# Patient Record
Sex: FEMALE | Race: WHITE | NOT HISPANIC OR LATINO | URBAN - METROPOLITAN AREA
[De-identification: names, ages, dates, MRNs, and addresses within clinical notes are randomized per-mention and may not be internally consistent; named-entity substitution may affect disease eponyms.]

---

## 2017-01-01 ENCOUNTER — HOSPITAL ENCOUNTER (OUTPATIENT)
Dept: PALLIATIVE MEDICINE | Facility: OTHER | Age: 32
Discharge: HOME | End: 2017-08-03
Attending: ANESTHESIOLOGY

## 2017-01-01 ENCOUNTER — COMMUNICATION - HEALTHEAST (OUTPATIENT)
Dept: PALLIATIVE MEDICINE | Facility: OTHER | Age: 32
End: 2017-01-01

## 2017-01-01 ENCOUNTER — AMBULATORY - HEALTHEAST (OUTPATIENT)
Dept: PALLIATIVE MEDICINE | Facility: OTHER | Age: 32
End: 2017-01-01

## 2017-01-01 ENCOUNTER — COMMUNICATION - HEALTHEAST (OUTPATIENT)
Dept: PALLIATIVE MEDICINE | Facility: CLINIC | Age: 32
End: 2017-01-01

## 2017-01-01 ENCOUNTER — RECORDS - HEALTHEAST (OUTPATIENT)
Dept: ADMINISTRATIVE | Facility: OTHER | Age: 32
End: 2017-01-01

## 2017-01-01 ENCOUNTER — ANESTHESIA - HEALTHEAST (OUTPATIENT)
Dept: SURGERY | Facility: CLINIC | Age: 32
End: 2017-01-01

## 2017-01-01 ENCOUNTER — HOSPITAL ENCOUNTER (OUTPATIENT)
Dept: PALLIATIVE MEDICINE | Facility: OTHER | Age: 32
Discharge: HOME | End: 2017-06-16
Attending: ANESTHESIOLOGY

## 2017-01-01 ENCOUNTER — AMBULATORY - HEALTHEAST (OUTPATIENT)
Dept: PALLIATIVE MEDICINE | Facility: CLINIC | Age: 32
End: 2017-01-01

## 2017-01-01 ENCOUNTER — COMMUNICATION - HEALTHEAST (OUTPATIENT)
Dept: SCHEDULING | Facility: CLINIC | Age: 32
End: 2017-01-01

## 2017-01-01 ENCOUNTER — HOSPITAL ENCOUNTER (OUTPATIENT)
Dept: PALLIATIVE MEDICINE | Facility: OTHER | Age: 32
Discharge: HOME | End: 2017-09-01
Attending: ANESTHESIOLOGY

## 2017-01-01 ENCOUNTER — HOSPITAL ENCOUNTER (OUTPATIENT)
Dept: PALLIATIVE MEDICINE | Facility: OTHER | Age: 32
Discharge: HOME | End: 2017-05-03
Attending: ANESTHESIOLOGY

## 2017-01-01 ENCOUNTER — AMBULATORY - RIVER FALLS (OUTPATIENT)
Dept: FAMILY MEDICINE | Facility: CLINIC | Age: 32
End: 2017-01-01

## 2017-01-01 ENCOUNTER — HOSPITAL ENCOUNTER (OUTPATIENT)
Dept: PALLIATIVE MEDICINE | Facility: OTHER | Age: 32
Discharge: HOME | End: 2017-01-18
Attending: ANESTHESIOLOGY

## 2017-01-01 ENCOUNTER — HOSPITAL ENCOUNTER (OUTPATIENT)
Dept: PALLIATIVE MEDICINE | Facility: OTHER | Age: 32
Discharge: HOME | End: 2017-03-22
Attending: ANESTHESIOLOGY

## 2017-01-01 DIAGNOSIS — R52 PAIN: ICD-10-CM

## 2017-01-01 DIAGNOSIS — G89.4 CHRONIC PAIN ASSOCIATED WITH SIGNIFICANT PSYCHOSOCIAL DYSFUNCTION: ICD-10-CM

## 2017-01-01 DIAGNOSIS — G89.4 CHRONIC PAIN SYNDROME: ICD-10-CM

## 2017-01-01 DIAGNOSIS — G89.29 CHRONIC PAIN: ICD-10-CM

## 2017-01-01 RX ORDER — ATROPA BELLADONNA AND OPIUM 16.2; 6 MG/1; MG/1
60 SUPPOSITORY RECTAL 2 TIMES DAILY
Status: SHIPPED | COMMUNITY
Start: 2017-01-01

## 2017-01-01 RX ORDER — NALOXONE HYDROCHLORIDE 0.4 MG/ML
0.4 INJECTION, SOLUTION INTRAMUSCULAR; INTRAVENOUS; SUBCUTANEOUS SEE ADMIN INSTRUCTIONS
Qty: 1 ML | Refills: 0 | Status: SHIPPED | OUTPATIENT
Start: 2017-01-01

## 2017-01-01 RX ORDER — MORPHINE SULFATE 10 MG/5ML
SOLUTION ORAL
Qty: 500 ML | Refills: 0 | Status: SHIPPED | OUTPATIENT
Start: 2017-01-01

## 2017-01-01 RX ORDER — LORAZEPAM 2 MG/ML
0.5 CONCENTRATE ORAL EVERY 8 HOURS PRN
Status: SHIPPED | COMMUNITY
Start: 2017-01-01

## 2017-01-01 RX ORDER — FENTANYL 75 UG/1
1 PATCH TRANSDERMAL
Qty: 10 PATCH | Refills: 0 | Status: SHIPPED | OUTPATIENT
Start: 2017-01-01

## 2017-01-01 RX ORDER — ZOLPIDEM TARTRATE 10 MG/1
10 TABLET ORAL
Status: SHIPPED | COMMUNITY
Start: 2017-01-01

## 2017-01-01 ASSESSMENT — MIFFLIN-ST. JEOR
SCORE: 1628.89
SCORE: 1628.89
SCORE: 1610.75
SCORE: 1647.04
SCORE: 1551.78
SCORE: 1628.89

## 2021-05-30 VITALS — WEIGHT: 192 LBS | HEIGHT: 71 IN | BODY MASS INDEX: 26.88 KG/M2

## 2021-05-30 VITALS — BODY MASS INDEX: 23.94 KG/M2 | HEIGHT: 71 IN | WEIGHT: 171 LBS

## 2021-05-30 VITALS — WEIGHT: 188 LBS | HEIGHT: 71 IN | BODY MASS INDEX: 26.32 KG/M2

## 2021-05-31 ENCOUNTER — RECORDS - HEALTHEAST (OUTPATIENT)
Dept: ADMINISTRATIVE | Facility: CLINIC | Age: 36
End: 2021-05-31

## 2021-05-31 VITALS — BODY MASS INDEX: 27.75 KG/M2 | HEIGHT: 71 IN | BODY MASS INDEX: 27.75 KG/M2 | HEIGHT: 71 IN

## 2021-05-31 VITALS — WEIGHT: 188 LBS | HEIGHT: 71 IN | BODY MASS INDEX: 26.32 KG/M2

## 2021-05-31 VITALS — WEIGHT: 184 LBS | BODY MASS INDEX: 25.66 KG/M2 | WEIGHT: 184 LBS | BODY MASS INDEX: 25.66 KG/M2

## 2021-05-31 VITALS — HEIGHT: 71 IN | BODY MASS INDEX: 25.76 KG/M2 | WEIGHT: 184 LBS

## 2021-05-31 VITALS — BODY MASS INDEX: 26.32 KG/M2 | HEIGHT: 71 IN | WEIGHT: 188 LBS

## 2021-06-01 ENCOUNTER — RECORDS - HEALTHEAST (OUTPATIENT)
Dept: ADMINISTRATIVE | Facility: CLINIC | Age: 36
End: 2021-06-01

## 2021-06-08 NOTE — ANESTHESIA CARE TRANSFER NOTE
Last vitals:   Vitals:    01/04/17 0835   BP: 139/89   Pulse: 80   Resp: 16   Temp: 37  C (98.6  F)   SpO2: 100%     Patient's level of consciousness is drowsy  Spontaneous respirations: yes  Maintains airway independently: yes  Dentition unchanged: yes  Oropharynx: oropharynx clear of all foreign objects    QCDR Measures:  ASA# 20 - Surgical Safety Checklist: ASA20A - Safety Checks Done  PQRS# 430 - Adult PONV Prevention: 4558F - Pt received => 2 anti-emetic agents (different classes) preop & intraop  ASA# 8 - Peds PONV Prevention: NA - Not pediatric patient, not GA or 2 or more risk factors NOT present  PQRS# 424 - Yissel-op Temp Management: 4559F - At least one body temp DOCUMENTED => 35.5C or 95.9F within required timeframe  PQRS# 426 - PACU Transfer Protocol: - Transfer of care checklist used  ASA# 14 - Acute Post-op Pain: ASA14B - Patient did NOT experience pain >= 7 out of 10    I completed my SBAR handoff to the receiving nurse per policy and procedure.

## 2021-06-08 NOTE — ANESTHESIA PREPROCEDURE EVALUATION
Anesthesia Evaluation      Patient summary reviewed   No history of anesthetic complications     Airway   Mallampati: II  Neck ROM: full   Pulmonary - negative ROS and normal exam   (+) asthma    (-) shortness of breath, recent URI, sleep apnea                         Cardiovascular - negative ROS and normal exam  Exercise tolerance: > or = 4 METS  (+) hypertension, ,     (-) angina  ECG reviewed        Neuro/Psych - negative ROS   (+) seizures (pseudoseizures), anxiety/panic attacks, chronic pain    Endo/Other - negative ROS   (+) diabetes mellitus, obesity,      GI/Hepatic/Renal - negative ROS     Comments: Dysphagia  Gastroparesis with chronic emesis     Other findings:   Major depressive disorder, recurrent, severe without psychotic features    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  Questions answered.

## 2021-06-08 NOTE — ANESTHESIA POSTPROCEDURE EVALUATION
Patient: Nicolasa Melissa  * No procedures listed *  Anesthesia type: general    Patient location: PACU    Last vitals:   Vitals:    01/04/17 0930   BP: 125/79   Pulse: 73   Resp: 16   Temp:    SpO2: 98%     Post vital signs: stable  Level of consciousness: awake, alert and responds to simple questions  Post-anesthesia pain: pain controlled  Post-anesthesia nausea and vomiting: no  Pulmonary: unassisted, return to baseline  Cardiovascular: stable and blood pressure at baseline  Hydration: adequate  Anesthetic events: no    QCDR Measures:  ASA# 11 - Yissel-op Cardiac Arrest: ASA11B - Patient did NOT experience unanticipated cardiac arrest  ASA# 12 - Yissel-op Mortality Rate: ASA12B - Patient did NOT die  ASA# 13 - PACU Re-Intubation Rate: NA - No ETT / LMA used for case  ASA# 10 - Composite Anes Safety: ASA10A - No serious adverse event  ASA# 38 - New Corneal Injury: ASA38A - No new exposure keratitis or corneal abrasion in PACU    Additional Notes:

## 2021-06-08 NOTE — ANESTHESIA PREPROCEDURE EVALUATION
Anesthesia Evaluation      Patient summary reviewed   No history of anesthetic complications     Airway   Mallampati: II  Neck ROM: full   Pulmonary - negative ROS and normal exam   (+) asthma    (-) shortness of breath, recent URI, sleep apnea                         Cardiovascular - negative ROS and normal exam  Exercise tolerance: > or = 4 METS  (+) hypertension, ,     (-) angina  ECG reviewed        Neuro/Psych - negative ROS   (+) seizures (pseudoseizures), anxiety/panic attacks, chronic pain    Endo/Other - negative ROS   (+) diabetes mellitus, obesity,   (-) not pregnant     GI/Hepatic/Renal - negative ROS   (+) GERD intermittent,       Comments: Dysphagia  Gastroparesis with chronic emesis     Other findings:   Major depressive disorder, recurrent, severe without psychotic features    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  Questions answered.

## 2021-06-08 NOTE — PROGRESS NOTES
DATE OF SERVICE: 01/18/2017    Nicolasa continues with medical management.  The sepsis has resolved, though she was  in the ER Sunday for a kidney infection.  She has seen the urologist.  There was a  urinalysis done showing blood.  While the culture was pending she was not started on  anything.  She is allergic to a number of antibiotics.  She seemed to get worse  medically.  She is now on Cipro.    She sees the surgeon, Dr. Goldy Jacobo next week.  She has a J-tube, they will  consider a G-tube for venting.  She has had significant more nausea despite a number  of medication regimens.  She will also have a new port placed for fluids as she has  had a PICC line.    Her weight has gone from 220 to 194, in a short time with diuresis.    She has not been doing any myofascial release as she has been too ill.    She continues on Subutex 8 mg 4 times a day and uses tramadol for breakthrough 1 to  2 tablets 2 or 3 times a day with benefit.  Notes that she has been in the hospital  a couple of times.    Reviewed discharge summary from 10/2015.  She had been hospitalized on the substance  abuse unit after an overdose because her IVs could not be managed.  The  addictionologist in the discharge summary did not indicate there was a history of  opioid addiction or abuse, rather noted there was opioid dependence for which the  Suboxone was used.  She describes that its indication has been for pain.  She had  been previously on Suboxone, that provider left, and she had used other opioids  previously and developed tolerance, which is why this was used.  There are insurance  issues about continuing on the Suboxone which are trying to be addressed.    Blood pressure 113/67, pulse 90, pain score 7.  She is alert and appropriately  groomed.  Clear sensorium, somewhat dysphoric affect as she reviews her medical  struggles.    She has been continued with ECT in the maintenance schedule last on 01/04.  She  follows with Dr. Kaiser for  psychiatric concerns.    IMPRESSION:  Chronic pain related to gastroparesis after vagal nerve injury during  gastric bypass.  She has had G-tubes, J-tubes, problems with infection.    She will be seeing her surgeon next week for other interventions.    She is encouraged to pursue the visceral myofascial release as she feels able.    She notes that her insurance has changed and she will need to look into providers  that use Subutex for pain and will take her insurance.  I suggested she also contact  pain clinics in [place] to see if they use Belbuca or other buprenorphine products  at high dose.  For the short term for the present we will continue to follow her at  intervals.    Time spent 15 minutes face to face, more than 50% counseling about above condition  and coordination of treatment plan.      JOHN RUIZ MD  la  D 01/26/2017 15:54:07  T 01/26/2017 17:15:54  R 01/26/2017 17:15:54  [id number]        cc:LONNY GUY MD

## 2021-06-08 NOTE — ANESTHESIA POSTPROCEDURE EVALUATION
Patient: Nicolasa Melissa  * No procedures listed *  Anesthesia type: general    Patient location: PACU  Last vitals:   Vitals:    02/15/17 0859   BP:    Pulse: 84   Resp: 16   Temp:    SpO2:      Post vital signs: stable  Level of consciousness: awake and responds to simple questions  Post-anesthesia pain: pain controlled  Post-anesthesia nausea and vomiting: no  Pulmonary: unassisted, return to baseline  Cardiovascular: stable and blood pressure at baseline  Hydration: adequate  Anesthetic events: no    QCDR Measures:  ASA# 11 - Yissel-op Cardiac Arrest: ASA11B - Patient did NOT experience unanticipated cardiac arrest  ASA# 12 - Yissel-op Mortality Rate: ASA12B - Patient did NOT die  ASA# 13 - PACU Re-Intubation Rate: ASA13B - Patient did NOT require a new airway mgmt  ASA# 10 - Composite Anes Safety: ASA10A - No serious adverse event  ASA# 38 - New Corneal Injury: ASA38A - No new exposure keratitis or corneal abrasion in PACU    Additional Notes:

## 2021-06-08 NOTE — ANESTHESIA CARE TRANSFER NOTE
Last vitals:   Vitals:    02/15/17 0817   BP: 132/76   Pulse: 80   Resp: 16   Temp: 36.9  C (98.5  F)   SpO2: 97%     Patient's level of consciousness is drowsy  Spontaneous respirations: yes  Maintains airway independently: yes  Dentition unchanged: yes  Oropharynx: oropharynx clear of all foreign objects    QCDR Measures:  ASA# 20 - Surgical Safety Checklist: ASA20A - Safety Checks Done  PQRS# 430 - Adult PONV Prevention: NA - Not adult patient, not GA or 3 or more risk factors NOT present  ASA# 8 - Peds PONV Prevention: NA - Not pediatric patient, not GA or 2 or more risk factors NOT present  PQRS# 424 - Yissel-op Temp Management: 4559F - At least one body temp DOCUMENTED => 35.5C or 95.9F within required timeframe  PQRS# 426 - PACU Transfer Protocol: - Transfer of care checklist used  ASA# 14 - Acute Post-op Pain: ASA14B - Patient did NOT experience pain >= 7 out of 10    I completed my SBAR handoff to the receiving nurse per policy and procedure.

## 2021-06-09 NOTE — PROGRESS NOTES
DATE OF SERVICE: 03/22/2017    Nicolasa reviews going to the [facility] on 03/05 and then to [facility] there from the  6th to the 17th.  She describes they cannot figure out how to manage her abdominal  pain.  When they increase the tube feeding there is increased pain in her side.   Started in December, seeming to be getting worse.  She was given a prescription for Hyoscamine but insurance did not cover as it is not FDA approved though it did help in the  hospital.    She has had a small bowel series, the opioids do not seem to be a factor with  constipation as she has done numerous studies.    She notes the GI doctors do not have any clear next step noting that she may need to  have TPN if she cannot maintain the tube feeds.  The hospitalists do not want her to  have been TPN.  She is presently doing limited tube feeds at the rate of 5 mL an  hour 20 hours a day.  Her weight is down 171.  This is 12.6% weight loss over the  last several weeks.  She has been told if she goes to 150 will need to be  hospitalized on TPN.    She has been using the liquid oxycodone, 1.5 mL or 30 mg every 4 hours.  She shows  me a bottle of getting 30 mL boxes.    She notes when she does not have the tube feeds increased it seems to do okay.    She reviews the Subutex did not seem to control well enough after surgery.  They did  give her ketamine as well in the hospital after surgery.    She has a home health aide who comes to help with some of her activities at home as  she has become essentially quite homebound.    She did have ECT on 02/20, having it every 6 weeks.  She notes her psychiatrist does  not particularly support this concern for long-term problems, though she notes she  has been doing this for 4 years now with no cognitive issues.    Review spending her time at home making [employer], her father who took an early  snf also lives in the home with them.    Blood pressure 126/84, pulse 77, pain score 9.  She is alert with  a clear sensorium,  full range of affect, congruent as she describes her stressors.  Her port is visible  and she notes that this may be changed.     was reviewed.    IMPRESSION:  Chronic pain relates to abdominal pain related to gastroparesis after  vagal nerve injury during gastric bypass.  She has had a variety of G tubes, J tubes  interventions with GI.  Of concern, the tube feeding rate may not be adequate to  meet her nutrition.  She does note changing from the Suboxone to the liquid  oxycodone and has been better tolerated allowing her to manage while this was  addressed.  Will not make changes.    Time spent 25 minutes face to face, more than 50% counseling about above condition  and coordination of treatment plan.      JOHN RUIZ MD  pa  D 03/26/2017 19:58:13  T 03/27/2017 04:05:06  R 03/27/2017 05:14:29  [id number]        cc: LONNY GUY MD

## 2021-06-09 NOTE — ANESTHESIA POSTPROCEDURE EVALUATION
Patient: Nicolasa Melissa  * No procedures listed *  Anesthesia type: general    Patient location: PACU  Last vitals:   Vitals:    03/29/17 0815   BP: 106/56   Pulse: 61   Resp: 12   Temp:    SpO2: 96%     Post vital signs: stable  Level of consciousness: awake and responds to simple questions  Post-anesthesia pain: pain controlled  Post-anesthesia nausea and vomiting: no  Pulmonary: unassisted, return to baseline  Cardiovascular: stable and blood pressure at baseline  Hydration: adequate  Anesthetic events: no    QCDR Measures:  ASA# 11 - Yissel-op Cardiac Arrest: ASA11B - Patient did NOT experience unanticipated cardiac arrest  ASA# 12 - Yissel-op Mortality Rate: ASA12B - Patient did NOT die  ASA# 13 - PACU Re-Intubation Rate: NA - No ETT / LMA used for case  ASA# 10 - Composite Anes Safety: ASA10A - No serious adverse event  ASA# 38 - New Corneal Injury: ASA38A - No new exposure keratitis or corneal abrasion in PACU    Additional Notes:

## 2021-06-09 NOTE — ANESTHESIA CARE TRANSFER NOTE
Last vitals:   Vitals:    03/29/17 0807   BP: 120/70   Pulse: 60   Resp: 10   Temp:    SpO2: 95%     Patient's level of consciousness is drowsy  Spontaneous respirations: yes  Maintains airway independently: yes  Dentition unchanged: yes  Oropharynx: oropharynx clear of all foreign objects    QCDR Measures:  ASA# 20 - Surgical Safety Checklist: ASA20A - Safety Checks Done  PQRS# 430 - Adult PONV Prevention: NA - Not adult patient, not GA or 3 or more risk factors NOT present  ASA# 8 - Peds PONV Prevention: NA - Not pediatric patient, not GA or 2 or more risk factors NOT present  PQRS# 424 - Yissel-op Temp Management: NA - MAC anesthesia or case < 60 minutes  PQRS# 426 - PACU Transfer Protocol: - Transfer of care checklist used  ASA# 14 - Acute Post-op Pain: ASA14B - Patient did NOT experience pain >= 7 out of 10    I completed my SBAR handoff to the receiving nurse per policy and procedure.

## 2021-06-09 NOTE — ANESTHESIA PREPROCEDURE EVALUATION
Anesthesia Evaluation      Patient summary reviewed   No history of anesthetic complications     Airway   Mallampati: II  Neck ROM: full   Pulmonary - negative ROS and normal exam   (+) asthma    (-) shortness of breath, recent URI, sleep apnea                         Cardiovascular - normal exam  Exercise tolerance: > or = 4 METS  (+) hypertension, ,     (-) angina  ECG reviewed        Neuro/Psych    (+) seizures (pseudoseizures), depression, anxiety/panic attacks, chronic pain    Endo/Other    (+) diabetes mellitus, obesity,   (-) not pregnant     GI/Hepatic/Renal    (+) GERD,       Comments: Dysphagia  Gastroparesis with chronic emesis     Other findings:   Major depressive disorder, recurrent, severe without psychotic features    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  Full preoxygenation.  Full ASA monitors  ASA 3   Induction: intravenous   Anesthetic plan and risks discussed with: patient  Anesthesia plan special considerations: rapid sequence induction,   Post-op plan: routine recovery        Discussed general anesthesia with patient.  Questions answered.

## 2021-06-10 NOTE — ANESTHESIA CARE TRANSFER NOTE
Last vitals:   Vitals:    05/10/17 0802   BP: 119/60   Pulse: 75   Resp: 14   Temp: 37  C (98.6  F)   SpO2: 100%     Patient's level of consciousness is drowsy  Spontaneous respirations: yes  Maintains airway independently: yes  Dentition unchanged: yes  Oropharynx: oropharynx clear of all foreign objects    QCDR Measures:  ASA# 20 - Surgical Safety Checklist: ASA20A - Safety Checks Done  PQRS# 430 - Adult PONV Prevention: 4558F - Pt received => 2 anti-emetic agents (different classes) preop & intraop  ASA# 8 - Peds PONV Prevention: NA - Not pediatric patient, not GA or 2 or more risk factors NOT present  PQRS# 424 - Yissel-op Temp Management: 4559F - At least one body temp DOCUMENTED => 35.5C or 95.9F within required timeframe  PQRS# 426 - PACU Transfer Protocol: - Transfer of care checklist used  ASA# 14 - Acute Post-op Pain: ASA14B - Patient did NOT experience pain >= 7 out of 10    I completed my SBAR handoff to the receiving nurse per policy and procedure.

## 2021-06-10 NOTE — ANESTHESIA POSTPROCEDURE EVALUATION
Patient: Nicolasa Melissa  * No procedures listed *  Anesthesia type: general    Patient location: PACU  Last vitals:   Vitals:    05/10/17 0845   BP: 131/64   Pulse: 81   Resp: 16   Temp: 37.2  C (98.9  F)   SpO2: 100%     Post vital signs: stable  Level of consciousness: awake, alert and oriented  Post-anesthesia pain: pain controlled  Post-anesthesia nausea and vomiting: no  Pulmonary: unassisted, return to baseline  Cardiovascular: stable and blood pressure at baseline  Hydration: adequate  Anesthetic events: no    QCDR Measures:  ASA# 11 - Yissel-op Cardiac Arrest: ASA11B - Patient did NOT experience unanticipated cardiac arrest  ASA# 12 - Yissel-op Mortality Rate: ASA12B - Patient did NOT die  ASA# 13 - PACU Re-Intubation Rate: NA - No ETT / LMA used for case  ASA# 10 - Composite Anes Safety: ASA10A - No serious adverse event  ASA# 38 - New Corneal Injury: ASA38A - No new exposure keratitis or corneal abrasion in PACU    Additional Notes:

## 2021-06-10 NOTE — ANESTHESIA PREPROCEDURE EVALUATION
Anesthesia Evaluation      Patient summary reviewed   No history of anesthetic complications     Airway   Mallampati: II  Neck ROM: full   Pulmonary - negative ROS and normal exam    breath sounds clear to auscultation  (+) asthma                           Cardiovascular - negative ROS and normal exam  Exercise tolerance: > or = 4 METS  (+) hypertension, ,     (-) angina, murmur  ECG reviewed  Rhythm: regular  Rate: normal,    no murmur      Neuro/Psych - negative ROS   (+) seizures (pseudoseizures), anxiety/panic attacks, chronic pain    Endo/Other - negative ROS   (+) diabetes mellitus, obesity,      GI/Hepatic/Renal - negative ROS     Comments: Dysphagia  Gastroparesis with chronic emesis  On TPN     Other findings:   Major depressive disorder, recurrent, severe without psychotic features    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  Questions answered.
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
## 2021-06-11 NOTE — PROGRESS NOTES
"    DATE OF SERVICE: 06/16/2017    Heather mansfield has had an evaluation with home care services.  They describe given  [place]  [place] not considered homebound and not qualify for their services.  She reviews the  benefit she would hope from home health services, noting there is a palliative care  element to help with pain, nausea and emotional support.  They are looking into  another agency.  She reviews when they looked into it before there was not a  differentiation between palliative care and hospice.  Now there is a difference as  with palliative care they can do TPN, which she would like to use.  At the present  she is not eating, only sipping fluids.  She still vomits 3 or 4 times a day.  There  is concern she is developing an ulcer, at some point this may be too much for  esophagus to sustain.  They do want to try to restart for a tube feeding at the end  of July at a very low-dose.  Otherwise, she is getting enough fluids and TPN.    She has had some infections and been on IV antibiotics for blood and used  cultivation.  She also had a UTI and C. diff.  She is being followed for spots on  her lungs, unclear if it infection or some sort of metastases they are monitoring.    There is still constant abdominal pain, and she is \\"terrified\\" to try the feeding  tubes again.    She has been using the fentanyl patch 75 mcg, which seems to help some.  She does  not want to increase the doses.  Using the Tegaderm helps keep on.  She is using the  oxycodone liquid 10 to 40 mg every 4 hours depending upon her pain level.    She continues on her ECT every 4 weeks.    She will need surgery for her bladder stimulator in July, putting that off for now for the  infections.    Reviewed other opioids she has used, hydromorphone seemed to cause dizziness and  have hives.    Reviewed living in [place], medical cannabis is not an available option.    Blood " pressure 108/59, pulse 99.  Pain score is 8.  She notes she continues losing  weight, now down to 184.  Height 5 feet 11.  She is alert with a clear sensorium  able to laugh at some aspects of her situation.    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  She continues on TPN, with  plans to try again G-tube feedings, though had significant pain.  On this regimen,  we are trying to shift to focus to the fentanyl patch, rely less on oxycodone.    PLAN:  Discussed transitioning to liquid morphine, I will work with and [facility]  pharmacist to look into some of the dosing.  She is looking into the benefits of the  palliative care service to help with maintaining at home.    Time spent 25 minutes face to face, more than 50% counseling about above condition  and coordination of treatment plan.      JOHN RUIZ MD  pa  D 06/19/2017 16:54:13  T 06/20/2017 05:12:12  R 06/20/2017 05:31:22  [id number]        cc: LONNY GUY MD

## 2021-06-11 NOTE — ANESTHESIA POSTPROCEDURE EVALUATION
Patient: Nicolasa Melissa  * No procedures listed *  Anesthesia type: general    Patient location: PACU  Last vitals:   Vitals:    06/21/17 0910   BP: 137/74   Pulse: 88   Resp: 20   Temp:    SpO2: 100%     Post vital signs: stable  Level of consciousness: awake and responds to simple questions  Post-anesthesia pain: pain controlled  Post-anesthesia nausea and vomiting: no  Pulmonary: unassisted, return to baseline  Cardiovascular: stable and blood pressure at baseline  Hydration: adequate  Anesthetic events: no    QCDR Measures:  ASA# 11 - Yissel-op Cardiac Arrest: ASA11B - Patient did NOT experience unanticipated cardiac arrest  ASA# 12 - Yissel-op Mortality Rate: ASA12B - Patient did NOT die  ASA# 13 - PACU Re-Intubation Rate: ASA13B - Patient did NOT require a new airway mgmt  ASA# 10 - Composite Anes Safety: ASA10A - No serious adverse event  ASA# 38 - New Corneal Injury: ASA38A - No new exposure keratitis or corneal abrasion in PACU    Additional Notes:

## 2021-06-11 NOTE — ANESTHESIA CARE TRANSFER NOTE
Last vitals:   Vitals:    06/21/17 0821   BP: 118/64   Pulse: 73   Resp: 20   Temp: 36.6  C (97.8  F)   SpO2: 100%     Patient's level of consciousness is drowsy  Spontaneous respirations: yes  Maintains airway independently: yes  Dentition unchanged: yes  Oropharynx: oropharynx clear of all foreign objects    QCDR Measures:  ASA# 20 - Surgical Safety Checklist: ASA20A - Safety Checks Done  PQRS# 430 - Adult PONV Prevention: NA - Not adult patient, not GA or 3 or more risk factors NOT present  ASA# 8 - Peds PONV Prevention: NA - Not pediatric patient, not GA or 2 or more risk factors NOT present  PQRS# 424 - Yissel-op Temp Management: 4559F - At least one body temp DOCUMENTED => 35.5C or 95.9F within required timeframe  PQRS# 426 - PACU Transfer Protocol: - Transfer of care checklist used  ASA# 14 - Acute Post-op Pain: ASA14B - Patient did NOT experience pain >= 7 out of 10

## 2021-06-12 NOTE — PROGRESS NOTES
Cee reviews some problems with communication between her GI doctor when she has been hospitalized.  The GI doctor wanted her to have a central line placed that she could not tolerate the tube feedings which were to be 80 mL an hour of nutrition and 75 mL an hour of fluids.  She cannot tolerate more than 46 mL an hour.  She described earlier in the month we can her  in the middle the night feeling well and might pass out, infected.  She was taken emergency room, found to have a hemoglobin of 7.2 and received transfusion.  It is anticipated she may need transfusions every 4-6 weeks due to her GI problems and will be seen a hematologist.    Did have a PICC line placed on 816 take in fluids and started to get TPN with plans to decrease tube feeding.  She still has vomiting and nausea, problems with getting insurance to cover IV Zofran Protonix.  Feeling dehydrated she also tries to drink which leads to the vomiting.    She did have a discussed with her psychiatrist regarding ketamine and ECT.  She has her next ECT treatment on 913 and will discuss with them whether ketamine can serve as a replacement.  She has used 10 mg 4 times a day increased to 20 mg 4 times a day over the last 2 weeks.  She has been able to decrease her morphine from 35 mg a dose to 20 or 25 mg taken 4 times a day.  He does not have to wake up during the night to take it.  She continues on the fentanyl patch 75 mcg.  Her mood may be starting to do better.    She did go to the emergency room recently having a heart rate in the 30s.  She wondered if that was a side effect from the ketamine.  Discussed she has been on metoprolol stopped that 2 days before.  She is doing relatively better.  Is been on beta-blocker since December, did not associate that with her depression.  He recalls having ketamine infusions for 2 days when she was hospitalized in February with pain flareups on buprenorphine and did not have bradycardia at that time.    She  "has been receiving home health care and PCA support.    Blood pressure 133/85, pulse 61, resp. rate 16, height 5' 11\" (1.803 m), weight 188 lb (85.3 kg), last menstrual period 07/10/2017, not currently breastfeeding.    Score 8.  She is alert with a clear sensorium.  She has a backpack holding her fluids her PICC line.  Affect is somewhat constricted and she reviews her medical addition, though may appear a little more comfortable than last seen.  Psychomotor retardation.    Impression chronic pain relates to chronic abdominal pain, with vagal nerve involvement after surgery for gastric bypass.  There is been significant nausea and vomiting, problems with tolerating G-tube feeding.  Has comorbid depression.  We have started ketamine to help augment the opioids for pain.  It is hoped also may help mood and decrease her maintenance ECT treatments when she comes in every 6 weeks    Plan: She will increase her ketamine to 30 mg morning and noon and 20 mg afternoon and at bedtime to see if this will continue to help with pain and augment her mood.  Possible side effects discussed.    Continue with the morphine at 20-25 mg liquid form 4 times a day, decreasing as needed.  We will continue with the fentanyl 75 mcg patch for the present.    She continues with tizanidine 3 times a day and at bedtime for sleep.  May need to address this for the bradycardia as well as she comes off the beta-blocker.    Time spent more than 25 minutes face-to-face more than 50% counts about above condition and coordination treatment plan  "

## 2021-06-12 NOTE — ANESTHESIA CARE TRANSFER NOTE
Last vitals:   Vitals:    09/13/17 0846   BP: 141/74   Pulse: 65   Resp: 14   Temp: 36.6  C (97.9  F)   SpO2: 100%     Patient's level of consciousness is drowsy  Spontaneous respirations: yes  Maintains airway independently: yes  Dentition unchanged: yes  Oropharynx: oropharynx clear of all foreign objects    QCDR Measures:  ASA# 20 - Surgical Safety Checklist: WHO surgical safety checklist completed prior to induction  PQRS# 430 - Adult PONV Prevention: NA - Not adult patient, not GA or 3 or more risk factors NOT present  ASA# 8 - Peds PONV Prevention: NA - Not pediatric patient, not GA or 2 or more risk factors NOT present  PQRS# 424 - Yissel-op Temp Management: NA - MAC anesthesia or case < 60 minutes  PQRS# 426 - PACU Transfer Protocol: - Transfer of care checklist used  ASA# 14 - Acute Post-op Pain: ASA14B - Patient did NOT experience pain >= 7 out of 10

## 2021-06-12 NOTE — ANESTHESIA POSTPROCEDURE EVALUATION
Patient: Nicolasa Melissa  * No procedures listed *  Anesthesia type: general    Patient location: PACU  Last vitals:   Vitals:    08/02/17 0820   BP: 126/64   Pulse: 87   Resp: 20   Temp:    SpO2: 100%     Post vital signs: stable  Level of consciousness: awake and responds to simple questions  Post-anesthesia pain: pain controlled  Post-anesthesia nausea and vomiting: no  Pulmonary: unassisted, return to baseline  Cardiovascular: stable and blood pressure at baseline  Hydration: adequate  Anesthetic events: no    QCDR Measures:  ASA# 11 - Yissel-op Cardiac Arrest: ASA11B - Patient did NOT experience unanticipated cardiac arrest  ASA# 12 - Yissel-op Mortality Rate: ASA12B - Patient did NOT die  ASA# 13 - PACU Re-Intubation Rate: NA - No ETT / LMA used for case  ASA# 10 - Composite Anes Safety: ASA10A - No serious adverse event  ASA# 38 - New Corneal Injury: ASA38A - No new exposure keratitis or corneal abrasion in PACU    Additional Notes:

## 2021-06-12 NOTE — ANESTHESIA POSTPROCEDURE EVALUATION
Patient: Nicolasa Melissa  * No procedures listed *  Anesthesia type: general    Patient location: PACU  Last vitals:   Vitals:    09/13/17 0900   BP: 105/52   Pulse: 68   Resp: 16   Temp:    SpO2: 100%     Post vital signs: stable  Level of consciousness: awake and responds to simple questions  Post-anesthesia pain: pain controlled  Post-anesthesia nausea and vomiting: no  Pulmonary: unassisted, return to baseline  Cardiovascular: stable and blood pressure at baseline  Hydration: adequate  Anesthetic events: no    QCDR Measures:  ASA# 11 - Yissel-op Cardiac Arrest: ASA11B - Patient did NOT experience unanticipated cardiac arrest  ASA# 12 - Yissel-op Mortality Rate: ASA12B - Patient did NOT die  ASA# 13 - PACU Re-Intubation Rate: NA - No ETT / LMA used for case  ASA# 10 - Composite Anes Safety: ASA10A - No serious adverse event    Additional Notes:  fsbg ok.  TPN was running throughout.

## 2021-06-12 NOTE — PROGRESS NOTES
DATE OF SERVICE: 08/03/2017    Nicolasa did have her bladder stimulation removed.  She will be doing straight  cathing for a while due to other medical issues.  She described she had a  stretching, the battery pads flipped and a lead pulled out.  She lost a lot of  weight in a short-term at time so it was more vulnerable to these changes.    She continues with tube feeding 24/7, indicates the backpack she has with her.  On  08/14 she has an appointment with GI to discuss possibly replacing the G-tube and  may be central access.  She just demonstrate she has had limited vascular approach.   She had ECT recently coming in every 6 weeks.  She notes there is benefit for 3 or 4  weeks and it wears off.    She has not yet had any contact with palliative care, though they would discuss this  as well.    We had made a trial of transitioning from the liquid oxycodone to liquid morphine.   There were some challenges with communication after the initial transition and she  went back to the OxyContin using 10 to 40 mg at a dose.  She has been using the  fentanyl 75 mcg patch.  She was up to 175 in the past.    She continues to have pain in the area below the tube.  Also has her chronic  right-sided abdominal pain she has had since 2003.    She notes vomiting continues with the tube 5 to 6 times a day.    Blood pressure 97/64, pulse 88.  Pain score 7.  She appears rather drowsy and I  inquired, she described because she had ECT yesterday she sleeps during the day then  did not sleep well last night and she is tired.    IMPRESSION:  Chronic abdominal pain related to a history of gastric bypass surgery  and concern for vagal nerve injury.  She has been on TPN and it appears there is  mechanical pain both with the TPN and G-tube feedings.    She has been using oxycodone liquid as she is n.p.o., and fentanyl patch.    PLAN:  We described again a trial of going to the morphine as she is concerned the  oxycodone has limited  benefit, increasing from 15 to 35 mg every 4 hours doses.    Discussed the use of ketamine for pain and mood, 10 mg troches under tongue 3 times  a day.  If helpful for mood she could hold on ECT which has been very disruptive.    Continue the fentanyl 75 mcg patch, decrease that if she benefits from ketamine.    I will contact Dr. Villalpando her GI doctor to get a better understanding of the  situation.  Reviewed whether palliative care at some point might be simpler to  manage and conserve some of her energy.    Time spent 25 minutes face to face, more than 50% counseling about above condition  and coordination of treatment plan.      JOHN RUIZ MD  pa  D 08/07/2017 17:17:33  T 08/08/2017 05:48:36  R 08/08/2017 06:22:36  [id number]        cc: LONNY GUY MD     8/8 discussion with Dr. Villalpando.  She is followed her for 2 years, will be having a discussion about going back to TPN.  There is concern she gets line infections which could be life threatening.  The tube feeds however vitamin challenging with nausea and vomiting.  There was a recent discussion with the last hospitalization with ethics about this approach.  She does not see her on a palliative state at this point, as Cee had told me that with her vomiting 5 or 6 times a day she has been told her esophagus may just give out at some point.  Her great L does feel the pain around the tube feeding site is consistent, some people do not tolerate tubes with the disruption of their muscular layers.  There is always a balance of the opioids for pain and slowing with GI transit.  I reviewed that we are using liquid oxycodone and morphine with fentanyl patches.  It is appropriate from her perspective.  She will call if there are other change in the treatment plan should be aware of

## 2021-06-12 NOTE — ANESTHESIA PREPROCEDURE EVALUATION
Anesthesia Evaluation      Patient summary reviewed   No history of anesthetic complications     Airway   Mallampati: II  Neck ROM: full   Pulmonary - negative ROS and normal exam   (+) asthma    (-) shortness of breath, recent URI, sleep apnea                         Cardiovascular - normal exam  Exercise tolerance: > or = 4 METS  (+) hypertension, ,     (-) angina  ECG reviewed        Neuro/Psych    (+) seizures (pseudoseizures), depression, anxiety/panic attacks, chronic pain    Endo/Other    (+) diabetes mellitus, obesity,   (-) not pregnant     Comments: Now on tpn.  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  Patient sometimes has hypoglycemic unawareness.  Will monitor sugars closely             Anesthesia Plan  Planned anesthetic: general mask and total IV anesthesia  Brief TIVA.  Full preoxygenation.  Full ASA monitors  ASA 3   Induction: intravenous   Anesthetic plan and risks discussed with: patient  Anesthesia plan special considerations: rapid sequence induction,   Post-op plan: routine recovery        Discussed general anesthesia with patient.  Questions answered.

## 2021-06-12 NOTE — ANESTHESIA PREPROCEDURE EVALUATION
Anesthesia Evaluation      Patient summary reviewed   No history of anesthetic complications     Airway   Mallampati: III  Neck ROM: full   Pulmonary - negative ROS and normal exam   (+) asthma                           Cardiovascular - negative ROS and normal exam  Exercise tolerance: > or = 4 METS  (+) hypertension, ,     (-) angina  ECG reviewed        Neuro/Psych - negative ROS   (+) seizures (pseudoseizures), anxiety/panic attacks, chronic pain    Endo/Other - negative ROS   (+) diabetes mellitus, obesity,      GI/Hepatic/Renal - negative ROS     Comments: Dysphagia  Gastroparesis with chronic emesis  On TPN     Other findings:   Major depressive disorder, recurrent, severe without psychotic features    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

## 2021-06-12 NOTE — ANESTHESIA CARE TRANSFER NOTE
Last vitals:   Vitals:    08/02/17 0819   BP: 124/67   Pulse: 86   Resp: 18   Temp: 37.2  C (98.9  F)   SpO2: 97%     Patient's level of consciousness is drowsy  Spontaneous respirations: yes  Maintains airway independently: yes  Dentition unchanged: yes  Oropharynx: oropharynx clear of all foreign objects    QCDR Measures:  ASA# 20 - Surgical Safety Checklist: WHO surgical safety checklist completed prior to induction  PQRS# 430 - Adult PONV Prevention: NA - Not adult patient, not GA or 3 or more risk factors NOT present  ASA# 8 - Peds PONV Prevention: NA - Not pediatric patient, not GA or 2 or more risk factors NOT present  PQRS# 424 - Yissel-op Temp Management: NA - MAC anesthesia or case < 60 minutes  PQRS# 426 - PACU Transfer Protocol: - Transfer of care checklist used  ASA# 14 - Acute Post-op Pain: NA - Patient under age 10y or did not go to PACU

## 2021-07-03 NOTE — ADDENDUM NOTE
Addendum Note by Garry Butler MD at 8/3/2017 11:59 PM     Author: Garry Butler MD Service: -- Author Type: Physician    Filed: 8/8/2017  9:14 AM Date of Service: 8/3/2017 11:59 PM Status: Signed    : Garry Butler MD (Physician)    Encounter addended by: Garry Butler MD on: 8/8/2017  9:14 AM<BR>     Actions taken: Sign clinical note